# Patient Record
Sex: FEMALE | Race: WHITE | Employment: UNEMPLOYED | URBAN - METROPOLITAN AREA
[De-identification: names, ages, dates, MRNs, and addresses within clinical notes are randomized per-mention and may not be internally consistent; named-entity substitution may affect disease eponyms.]

---

## 2021-06-04 ENCOUNTER — APPOINTMENT (OUTPATIENT)
Dept: CT IMAGING | Facility: CLINIC | Age: 41
End: 2021-06-04
Attending: EMERGENCY MEDICINE

## 2021-06-04 ENCOUNTER — HOSPITAL ENCOUNTER (EMERGENCY)
Facility: CLINIC | Age: 41
Discharge: HOME | End: 2021-06-04
Attending: EMERGENCY MEDICINE | Admitting: EMERGENCY MEDICINE

## 2021-06-04 ENCOUNTER — APPOINTMENT (OUTPATIENT)
Dept: ULTRASOUND IMAGING | Facility: CLINIC | Age: 41
End: 2021-06-04
Attending: EMERGENCY MEDICINE

## 2021-06-04 VITALS
DIASTOLIC BLOOD PRESSURE: 89 MMHG | HEART RATE: 100 BPM | RESPIRATION RATE: 18 BRPM | WEIGHT: 250 LBS | OXYGEN SATURATION: 96 % | BODY MASS INDEX: 40.18 KG/M2 | SYSTOLIC BLOOD PRESSURE: 126 MMHG | HEIGHT: 66 IN | TEMPERATURE: 98.3 F

## 2021-06-04 DIAGNOSIS — R10.11 ABDOMINAL PAIN, RIGHT UPPER QUADRANT: ICD-10-CM

## 2021-06-04 DIAGNOSIS — J18.9 COMMUNITY ACQUIRED PNEUMONIA, UNSPECIFIED LATERALITY: ICD-10-CM

## 2021-06-04 LAB
ALBUMIN SERPL-MCNC: 3 G/DL (ref 3.4–5)
ALBUMIN UR-MCNC: NEGATIVE MG/DL
ALP SERPL-CCNC: 88 U/L (ref 40–150)
ALT SERPL W P-5'-P-CCNC: 25 U/L (ref 0–50)
ANION GAP SERPL CALCULATED.3IONS-SCNC: 9 MMOL/L (ref 3–14)
APPEARANCE UR: CLEAR
AST SERPL W P-5'-P-CCNC: 17 U/L (ref 0–45)
BASOPHILS # BLD AUTO: 0.1 10E9/L (ref 0–0.2)
BASOPHILS NFR BLD AUTO: 0.3 %
BILIRUB SERPL-MCNC: 0.7 MG/DL (ref 0.2–1.3)
BILIRUB UR QL STRIP: NEGATIVE
BUN SERPL-MCNC: 12 MG/DL (ref 7–30)
CALCIUM SERPL-MCNC: 8.8 MG/DL (ref 8.5–10.1)
CHLORIDE SERPL-SCNC: 104 MMOL/L (ref 94–109)
CO2 SERPL-SCNC: 23 MMOL/L (ref 20–32)
COLOR UR AUTO: ABNORMAL
CREAT SERPL-MCNC: 0.65 MG/DL (ref 0.52–1.04)
DIFFERENTIAL METHOD BLD: ABNORMAL
EOSINOPHIL # BLD AUTO: 0 10E9/L (ref 0–0.7)
EOSINOPHIL NFR BLD AUTO: 0.2 %
ERYTHROCYTE [DISTWIDTH] IN BLOOD BY AUTOMATED COUNT: 13.8 % (ref 10–15)
GFR SERPL CREATININE-BSD FRML MDRD: >90 ML/MIN/{1.73_M2}
GLUCOSE SERPL-MCNC: 172 MG/DL (ref 70–99)
GLUCOSE UR STRIP-MCNC: NEGATIVE MG/DL
HCG SERPL QL: NEGATIVE
HCT VFR BLD AUTO: 39.1 % (ref 35–47)
HGB BLD-MCNC: 12.6 G/DL (ref 11.7–15.7)
HGB UR QL STRIP: NEGATIVE
IMM GRANULOCYTES # BLD: 0.1 10E9/L (ref 0–0.4)
IMM GRANULOCYTES NFR BLD: 0.7 %
KETONES UR STRIP-MCNC: NEGATIVE MG/DL
LABORATORY COMMENT REPORT: NORMAL
LACTATE BLD-SCNC: 0.7 MMOL/L (ref 0.7–2)
LEUKOCYTE ESTERASE UR QL STRIP: NEGATIVE
LIPASE SERPL-CCNC: 94 U/L (ref 73–393)
LYMPHOCYTES # BLD AUTO: 1.7 10E9/L (ref 0.8–5.3)
LYMPHOCYTES NFR BLD AUTO: 8.1 %
MCH RBC QN AUTO: 27.9 PG (ref 26.5–33)
MCHC RBC AUTO-ENTMCNC: 32.2 G/DL (ref 31.5–36.5)
MCV RBC AUTO: 87 FL (ref 78–100)
MONOCYTES # BLD AUTO: 1.8 10E9/L (ref 0–1.3)
MONOCYTES NFR BLD AUTO: 8.8 %
MUCOUS THREADS #/AREA URNS LPF: PRESENT /LPF
NEUTROPHILS # BLD AUTO: 17.1 10E9/L (ref 1.6–8.3)
NEUTROPHILS NFR BLD AUTO: 81.9 %
NITRATE UR QL: NEGATIVE
NRBC # BLD AUTO: 0 10*3/UL
NRBC BLD AUTO-RTO: 0 /100
PH UR STRIP: 5.5 PH (ref 5–7)
PLATELET # BLD AUTO: 362 10E9/L (ref 150–450)
POTASSIUM SERPL-SCNC: 3.9 MMOL/L (ref 3.4–5.3)
PROT SERPL-MCNC: 7.7 G/DL (ref 6.8–8.8)
RBC # BLD AUTO: 4.51 10E12/L (ref 3.8–5.2)
RBC #/AREA URNS AUTO: 0 /HPF (ref 0–2)
SARS-COV-2 RNA RESP QL NAA+PROBE: NEGATIVE
SODIUM SERPL-SCNC: 136 MMOL/L (ref 133–144)
SOURCE: ABNORMAL
SP GR UR STRIP: 1.02 (ref 1–1.03)
SPECIMEN SOURCE: NORMAL
SQUAMOUS #/AREA URNS AUTO: <1 /HPF (ref 0–1)
UROBILINOGEN UR STRIP-MCNC: 0 MG/DL (ref 0–2)
WBC # BLD AUTO: 20.8 10E9/L (ref 4–11)
WBC #/AREA URNS AUTO: 2 /HPF (ref 0–5)

## 2021-06-04 PROCEDURE — C9803 HOPD COVID-19 SPEC COLLECT: HCPCS

## 2021-06-04 PROCEDURE — 250N000009 HC RX 250: Performed by: EMERGENCY MEDICINE

## 2021-06-04 PROCEDURE — 80053 COMPREHEN METABOLIC PANEL: CPT | Performed by: EMERGENCY MEDICINE

## 2021-06-04 PROCEDURE — 87040 BLOOD CULTURE FOR BACTERIA: CPT | Performed by: EMERGENCY MEDICINE

## 2021-06-04 PROCEDURE — 250N000011 HC RX IP 250 OP 636: Performed by: EMERGENCY MEDICINE

## 2021-06-04 PROCEDURE — 96365 THER/PROPH/DIAG IV INF INIT: CPT | Mod: 59

## 2021-06-04 PROCEDURE — 99285 EMERGENCY DEPT VISIT HI MDM: CPT | Mod: 25

## 2021-06-04 PROCEDURE — 36415 COLL VENOUS BLD VENIPUNCTURE: CPT

## 2021-06-04 PROCEDURE — 83605 ASSAY OF LACTIC ACID: CPT | Performed by: EMERGENCY MEDICINE

## 2021-06-04 PROCEDURE — 250N000013 HC RX MED GY IP 250 OP 250 PS 637: Performed by: EMERGENCY MEDICINE

## 2021-06-04 PROCEDURE — 74177 CT ABD & PELVIS W/CONTRAST: CPT

## 2021-06-04 PROCEDURE — 83690 ASSAY OF LIPASE: CPT | Performed by: EMERGENCY MEDICINE

## 2021-06-04 PROCEDURE — 85025 COMPLETE CBC W/AUTO DIFF WBC: CPT | Performed by: EMERGENCY MEDICINE

## 2021-06-04 PROCEDURE — 87635 SARS-COV-2 COVID-19 AMP PRB: CPT | Performed by: EMERGENCY MEDICINE

## 2021-06-04 PROCEDURE — 84703 CHORIONIC GONADOTROPIN ASSAY: CPT | Performed by: EMERGENCY MEDICINE

## 2021-06-04 PROCEDURE — 76705 ECHO EXAM OF ABDOMEN: CPT

## 2021-06-04 PROCEDURE — 258N000003 HC RX IP 258 OP 636: Performed by: EMERGENCY MEDICINE

## 2021-06-04 PROCEDURE — 81001 URINALYSIS AUTO W/SCOPE: CPT | Performed by: EMERGENCY MEDICINE

## 2021-06-04 RX ORDER — AMOXICILLIN 500 MG/1
1000 CAPSULE ORAL 3 TIMES DAILY
Qty: 42 CAPSULE | Refills: 0 | Status: SHIPPED | OUTPATIENT
Start: 2021-06-04 | End: 2021-06-11

## 2021-06-04 RX ORDER — HYDROCODONE BITARTRATE AND ACETAMINOPHEN 5; 325 MG/1; MG/1
1 TABLET ORAL EVERY 6 HOURS PRN
Qty: 10 TABLET | Refills: 0 | Status: SHIPPED | OUTPATIENT
Start: 2021-06-04 | End: 2021-06-07

## 2021-06-04 RX ORDER — CEFTRIAXONE 2 G/1
2 INJECTION, POWDER, FOR SOLUTION INTRAMUSCULAR; INTRAVENOUS ONCE
Status: COMPLETED | OUTPATIENT
Start: 2021-06-04 | End: 2021-06-04

## 2021-06-04 RX ORDER — SENNA AND DOCUSATE SODIUM 50; 8.6 MG/1; MG/1
1 TABLET, FILM COATED ORAL
Qty: 7 TABLET | Refills: 0 | Status: SHIPPED | OUTPATIENT
Start: 2021-06-04 | End: 2021-06-11

## 2021-06-04 RX ORDER — IOPAMIDOL 755 MG/ML
125 INJECTION, SOLUTION INTRAVASCULAR ONCE
Status: COMPLETED | OUTPATIENT
Start: 2021-06-04 | End: 2021-06-04

## 2021-06-04 RX ORDER — AZITHROMYCIN 250 MG/1
TABLET, FILM COATED ORAL
Qty: 6 TABLET | Refills: 0 | Status: SHIPPED | OUTPATIENT
Start: 2021-06-05 | End: 2021-06-10

## 2021-06-04 RX ORDER — IBUPROFEN 200 MG
800 TABLET ORAL EVERY 6 HOURS PRN
COMMUNITY

## 2021-06-04 RX ORDER — HYDROMORPHONE HYDROCHLORIDE 1 MG/ML
0.5 INJECTION, SOLUTION INTRAMUSCULAR; INTRAVENOUS; SUBCUTANEOUS
Status: COMPLETED | OUTPATIENT
Start: 2021-06-04 | End: 2021-06-04

## 2021-06-04 RX ORDER — ONDANSETRON 2 MG/ML
4 INJECTION INTRAMUSCULAR; INTRAVENOUS EVERY 30 MIN PRN
Status: DISCONTINUED | OUTPATIENT
Start: 2021-06-04 | End: 2021-06-04 | Stop reason: HOSPADM

## 2021-06-04 RX ORDER — AZITHROMYCIN 250 MG/1
500 TABLET, FILM COATED ORAL ONCE
Status: COMPLETED | OUTPATIENT
Start: 2021-06-04 | End: 2021-06-04

## 2021-06-04 RX ORDER — HYDROCODONE BITARTRATE AND ACETAMINOPHEN 5; 325 MG/1; MG/1
2 TABLET ORAL ONCE
Status: COMPLETED | OUTPATIENT
Start: 2021-06-04 | End: 2021-06-04

## 2021-06-04 RX ADMIN — CEFTRIAXONE SODIUM 2 G: 2 INJECTION, POWDER, FOR SOLUTION INTRAMUSCULAR; INTRAVENOUS at 12:06

## 2021-06-04 RX ADMIN — HYDROMORPHONE HYDROCHLORIDE 0.5 MG: 1 INJECTION, SOLUTION INTRAMUSCULAR; INTRAVENOUS; SUBCUTANEOUS at 09:07

## 2021-06-04 RX ADMIN — ONDANSETRON 4 MG: 2 INJECTION INTRAMUSCULAR; INTRAVENOUS at 08:08

## 2021-06-04 RX ADMIN — HYDROMORPHONE HYDROCHLORIDE 0.5 MG: 1 INJECTION, SOLUTION INTRAMUSCULAR; INTRAVENOUS; SUBCUTANEOUS at 08:01

## 2021-06-04 RX ADMIN — HYDROMORPHONE HYDROCHLORIDE 0.5 MG: 1 INJECTION, SOLUTION INTRAMUSCULAR; INTRAVENOUS; SUBCUTANEOUS at 12:38

## 2021-06-04 RX ADMIN — HYDROCODONE BITARTRATE AND ACETAMINOPHEN 2 TABLET: 5; 325 TABLET ORAL at 13:50

## 2021-06-04 RX ADMIN — IOPAMIDOL 125 ML: 755 INJECTION, SOLUTION INTRAVENOUS at 09:53

## 2021-06-04 RX ADMIN — AZITHROMYCIN MONOHYDRATE 500 MG: 250 TABLET ORAL at 12:06

## 2021-06-04 RX ADMIN — SODIUM CHLORIDE 76 ML: 9 INJECTION, SOLUTION INTRAVENOUS at 09:54

## 2021-06-04 RX ADMIN — SODIUM CHLORIDE 1000 ML: 9 INJECTION, SOLUTION INTRAVENOUS at 09:35

## 2021-06-04 RX ADMIN — SODIUM CHLORIDE 1000 ML: 9 INJECTION, SOLUTION INTRAVENOUS at 07:58

## 2021-06-04 ASSESSMENT — ENCOUNTER SYMPTOMS
NAUSEA: 1
FEVER: 0
DIARRHEA: 0
ABDOMINAL PAIN: 1
ARTHRALGIAS: 1
SHORTNESS OF BREATH: 1
VOMITING: 0
COUGH: 1
CHILLS: 0

## 2021-06-04 ASSESSMENT — MIFFLIN-ST. JEOR: SCORE: 1825.74

## 2021-06-04 NOTE — DISCHARGE INSTRUCTIONS
We discussed that your symptoms today are not very typical for pneumonia.  However, based on your work-up today, it appears that that is the most likely cause of your pain.  The other possibility that we discussed is a blood clot in the lung.  At this point, I think that is less likely given the elevation in your white blood cell count, and your normal oxygen levels.  We do not see any signs of a large blood clot on the CT scan however it is not the best scan to look for blood clots.  If you feel worse at any time, with fever, worsening pain, increased shortness of breath, passing out, chest pain, or any other concerns, you should return to the ED right away.  Make sure you complete all your antibiotics, and call the emergency department if you do not tolerate them for what ever reason.  I have placed a referral for primary care physician, therefore you should be getting a call next week to help set up an appointment.

## 2021-06-04 NOTE — PHARMACY-ADMISSION MEDICATION HISTORY
Pharmacy Medication History  Admission medication history interview status for the 6/4/2021  admission is complete. See EPIC admission navigator for prior to admission medications     Location of Interview: Patient room  Medication history sources: Patient, Surescripts and Care Everywhere    Significant changes made to the medication list:  Added ibuprofen    In the past week, patient estimated taking medication this percent of the time: greater than 90%    Additional medication history information:   Patient had ibuprofen 800 mg this AM     Medication reconciliation completed by provider prior to medication history? No    Time spent in this activity: 5 mins    Prior to Admission medications    Medication Sig Last Dose Taking? Auth Provider   ibuprofen (ADVIL/MOTRIN) 200 MG tablet Take 800 mg by mouth every 6 hours as needed for mild pain 6/4/2021 at am (800mg)  Yes Unknown, Entered By History       The information provided in this note is only as accurate as the sources available at the time of update(s)

## 2021-06-04 NOTE — ED NOTES
SpO2 dropped while sleeping to 86% with a good wave form. Supplemental O2 applied via nasal canula 2 LPM

## 2021-06-04 NOTE — ED TRIAGE NOTES
Pt reports 2 days ago was moving and felt a pull in abd,  Now today having severe right sided abd pain with cramping, reports some relief with ice pack applied, denies n/v/d. ABC's intact

## 2021-06-04 NOTE — ED PROVIDER NOTES
History   Chief Complaint:  Abdominal Pain     HPI   Beba Vizcaino is a 41 year old female tobacco user who presents with abdominal pain. The patient reports that her pain began at approximately 0500 yesterday in the right upper abdomen. She describes the pain as constant, cramp-like, and jaren and was not resolved after self-medicating with ibuprofen. It became progressively more severe over the course of the day yesterday and this morning, which prompted her visit to the ED today. The pain has caused her to feel nauseous and short of breath. She also has a slight cough which worsens her abdominal pain. Additionally, the patient notes that she also has some pain in her posterior right shoulder. She states that yesterday she was moving out of her current home and states a concern for a pulled muscle. It is noted that the patient still has her gall bladder. Her menstrual cycle has just begun. She denies fever, chills, urine changes, diarrhea, vomiting, or chest pain. She also denies alcohol use. No history of leg swelling, travel, recent surgery, or contraceptive use.    Review of Systems   Constitutional: Negative for chills and fever.   Respiratory: Positive for cough and shortness of breath.    Cardiovascular: Negative for chest pain.   Gastrointestinal: Positive for abdominal pain and nausea. Negative for diarrhea and vomiting.   Genitourinary: Negative.    Musculoskeletal: Positive for arthralgias (R shoulder).   All other systems reviewed and are negative.    Allergies:  Cefprozil  Shellfish-Derived products    Medications:  The patient is currently on no regular medications.    Past Medical History:    Anemia, postpartum   delivery  Tobacco abuse    Past Surgical History:    [place] teeth extraction  Sinus procedure    Social History:  The patient is a tobacco user. She denies alcohol use.  The patient presents with her significant other.     Physical Exam     Patient Vitals for the past 24  "hrs:   BP Temp Temp src Pulse Resp SpO2 Height Weight   06/04/21 1200 124/83 -- -- 104 20 99 % -- --   06/04/21 1100 137/84 -- -- 113 16 99 % -- --   06/04/21 1036 (!) 142/80 98.3  F (36.8  C) Oral -- 16 100 % -- --   06/04/21 0930 (!) 143/84 -- -- 118 18 94 % -- --   06/04/21 0906 (!) 139/97 -- -- -- 18 95 % -- --   06/04/21 0807 137/86 -- -- 107 20 99 % -- --   06/04/21 0734 (!) 157/90 95.7  F (35.4  C) Temporal 123 18 98 % 1.676 m (5' 6\") 113.4 kg (250 lb)       Physical Exam  Gen:  Alert, in pain, holding her right upper abdomen.    Eye:   Pupils are equal, round, and reactive.     Sclera non-injected.    ENT:   Moist mucus membranes.     Normal tongue.      Cardiac:     Tachycardic rate and regular rhythm.    No murmurs, gallops, or rubs.    Pulmonary:     Clear to auscultation bilaterally.    No wheezes, rales, or rhonchi.    Abdomen:     Normal active bowel sounds.     Abdomen is soft and non-distended.   Severe tenderness of the right upper quadrant.  No crepitus.    Musculoskeletal:     Normal movement of all extremities without evidence for deficit.    Extremities:    No edema.  Calves non-tender.    Skin:   Warm and dry.    Neurologic:    Non-focal exam without asymmetric weakness or numbness.    Normal tone    Psychiatric:     Normal affect with appropriate interaction with examiner.      Emergency Department Course     Imaging:  US Abdomen Limited (RUQ)  1.  Borderline dilated common duct. If the patient has associated  abnormal liver function tests, further evaluation with MRCP may be  helpful.  2.  Normal liver and gallbladder. No cholelithiasis or evidence of  acute cholecystitis.  Reading per radiology.    CT Abdomen Pelvis W Contrast  1.  No acute or suspicious findings within the abdomen or pelvis to  spleen the patient's symptoms. No evidence of a bowel obstruction or  inflammatory process.  2.  Normal appendix.  3.  Mild/moderate patchy bibasilar pulmonary opacities which may  reflect " atelectasis or mild pneumonic infiltrates  Reading per radiology.    Laboratory:  CBC: WBC 20.8 (H), HGB 12.6,    CMP: Glucose 172 (H), Albumin 3.0 (L) o/w WNL (Creatinine 0.65)     Lactic acid (result time 0953) 0.7    Lipase: 94    HCG Qualitative: negative      UA with microscopic: Mucous present o/w WNL      Procalcitonin: Pending  Blood cultures pending x2    Asymptomatic COVID19 Virus PCR by nasopharyngeal swab: Negative       Emergency Department Course:    Reviewed:  I reviewed nursing notes, vitals, past medical history and care everywhere    Assessments:  0747 I obtained history and examined the patient as noted above.   1053 I rechecked the patient and explained findings.     Consults:  1327 I spoke with Dr. Rock, radiology, regarding the patient.  He reviewed the vascular anatomy is seen on the CT abdomen pelvis supplying right lower lung, and also does not see any clot to suggest PE.    Interventions:  0758 NS, 1 L, IV bolus   0801 Dilaudid 0.5 mg IV   0808 Zofran 4 mg IV   0907 Dilaudid 0.5 mg IV   0935 NS, 1 L, IV bolus   1206 Zithromax 500 mg Oral  1206 Rocephin 2 g IV     Disposition:  The patient was discharged to home.       Impression & Plan     Medical Decision Making:  This is a 41 year old female otherwise healthy who presents today with two days of right upper quadrant abdominal pain and cramping. On exam, the patient is tachycardic, but afebrile with normal oxygen saturation. Blood pressure improved with pain control. Labs demonstrate normal LFTs, normal lipase, normal lactic acid. Red blood cell count is very elevated to 20.8 with a left shift. Right upper quadrant ultrasound is negative for stones, but shows a borderline dilated common duct. Overall, no convincing evidence for cholecystitis or cholelithiasis. Given severe abdominal pain and leukocytosis, CT was obtained, and this is negative for intraabdominal pathology. She does however have patchy bibaslar infiltrates, right  greater than left. At this point, given leukocytosis, will treat for probable community acquired pneumonia. I considered pulmonary embolism, although I think this is unlikely. The subsegmental artery supplying that part of the lung is fairly well seen on the CT, does not appear to be obstructed with clot.  She has maintained a normal oxygen saturation. That being said, CT abdomen/pelvis is not optimal for ruling out some small subsegmental pulmonary embolism.   Normal d-dimer would have been reassuring, but elevated d-dimer would not have significantly changed decision making, because it might be elevated from pneumonia as well.  Ultimately, it was not ordered with initial labs, and blue top not drawn initially given shortage.  There was delay in relaying message for a re-draw, and ultimately, I cancelled the test.  I discussed with the patient options for admission, given diagnostic uncertainty. At this point, she prefers to return home, and will return to the ED for any new or worsening symptoms. We discussed that her condition could worsen, and she will have a low threshold to return to the ED. The patient still prefers to return home at this time.     Covid-19  Beba Vizcaino was evaluated during a global COVID-19 pandemic, which necessitated consideration that the patient might be at risk for infection with the SARS-CoV-2 virus that causes COVID-19.   Applicable protocols for evaluation were followed during the patient's care.     Diagnosis:    ICD-10-CM    1. Community acquired pneumonia, unspecified laterality  J18.9 Primary Care Referral   2. Abdominal pain, right upper quadrant  R10.11 Primary Care Referral       Discharge Medications:  New Prescriptions    AMOXICILLIN (AMOXIL) 500 MG CAPSULE    Take 2 capsules (1,000 mg) by mouth 3 times daily for 7 days    AZITHROMYCIN (ZITHROMAX) 250 MG TABLET    Take 2 tablets (500 mg) by mouth daily for 1 day, THEN 1 tablet (250 mg) daily for 4 days.       Scribe  Disclosure:  I, Tiffany Van, am serving as a scribe at 7:45 AM on 6/4/2021 to document services personally performed by Patricia Hernandez MD based on my observations and the provider's statements to me.     I, Toby Thurston, am serving as a scribe () on 6/4/2021 at 8:58 AM to personally document services performed by Patricia Hernandez MD based on my observations and the provider's statements to me.              Patricia Hernandez MD  06/04/21 1501

## 2021-06-10 LAB
BACTERIA SPEC CULT: NO GROWTH
BACTERIA SPEC CULT: NO GROWTH
SPECIMEN SOURCE: NORMAL
SPECIMEN SOURCE: NORMAL